# Patient Record
(demographics unavailable — no encounter records)

---

## 2024-12-19 NOTE — PHYSICAL EXAM
[Normal] : heart rate was normal and rhythm regular, normal S1 and S2, no murmurs [Bowel Sounds] : normal bowel sounds [No Masses] : no abdominal mass palpated [Abdomen Soft] : soft [] : no hepatosplenomegaly [RLQ] : in the right lower quadrant

## 2024-12-19 NOTE — ASSESSMENT
[FreeTextEntry1] : Impression: IBS, GERD/dyspepsia well-controlled on pantoprazole no longer on neuromodulators.  Not taking any antispasmodic/IBS medication.  Plan: Reviewed lactose-free/low FODMAP diet with patient to eliminate possible dietary triggers.  Will prescribe dicyclomine 20 mg 3 times daily as needed.  No workup indicated at this time

## 2024-12-19 NOTE — HISTORY OF PRESENT ILLNESS
[FreeTextEntry1] : Patient taking pantoprazole daily with generally good control of GERD/dyspepsia.  No longer on neuromodulators.  Has had several EGDs all normal with the exception of a small hiatal hernia.  Family Struve colon cancer most recent colonoscopy 2023 with 5-year recall.  Has been experiencing intermittent crampy pain right lower quadrant with occasional distention in that area associated with loose stools.  History of diverticulitis.